# Patient Record
Sex: MALE | URBAN - METROPOLITAN AREA
[De-identification: names, ages, dates, MRNs, and addresses within clinical notes are randomized per-mention and may not be internally consistent; named-entity substitution may affect disease eponyms.]

---

## 2023-12-01 ENCOUNTER — OFFICE (OUTPATIENT)
Dept: URBAN - METROPOLITAN AREA CLINIC 11 | Facility: CLINIC | Age: 28
End: 2023-12-01

## 2023-12-01 VITALS
WEIGHT: 277 LBS | HEIGHT: 72 IN | OXYGEN SATURATION: 98 % | DIASTOLIC BLOOD PRESSURE: 129 MMHG | HEART RATE: 102 BPM | SYSTOLIC BLOOD PRESSURE: 156 MMHG

## 2023-12-01 DIAGNOSIS — K60.2 ANAL FISSURE, UNSPECIFIED: ICD-10-CM

## 2023-12-01 DIAGNOSIS — K62.89 OTHER SPECIFIED DISEASES OF ANUS AND RECTUM: ICD-10-CM

## 2023-12-01 DIAGNOSIS — Z80.0 FAMILY HISTORY OF MALIGNANT NEOPLASM OF DIGESTIVE ORGANS: ICD-10-CM

## 2023-12-01 PROCEDURE — 99204 OFFICE O/P NEW MOD 45 MIN: CPT | Performed by: STUDENT IN AN ORGANIZED HEALTH CARE EDUCATION/TRAINING PROGRAM

## 2023-12-01 RX ORDER — SODIUM PICOSULFATE, MAGNESIUM OXIDE, AND ANHYDROUS CITRIC ACID 10; 3.5; 12 MG/160ML; G/160ML; G/160ML
LIQUID ORAL
Qty: 350 | Refills: 0 | Status: CANCELLED
Start: 2023-12-01 | End: 2023-12-07

## 2023-12-01 NOTE — SERVICEHPINOTES
Mr. Rodrick Padron is a 28-year-old white male with no significant past medical history, who presents to [place] [id number] for severe anorectal pain.
br
br clinic visit 12/01/2023: 
br patient reports that he has had severe pain for the last 2 days.  He has only been able to have a small amount of bowel movement and is having a lot of straining.  He is not able to sit for very long and has the sensation of hemorrhoids on the outside of his bottom.  He reports he has not been able to sleep the last 2 nights because of how severe the pain is.  He has never had an EGD or colonoscopy.  He is not passing any blood in the stool or black tarry stool.  His family history significant for his father who was diagnosed with colon cancer at age 29 and his grandfather who also had colon cancer.  The patient reports he also has some uncles with colon cancer.  He does not smoke or use recreational drugs but does drink a 6 pack of beer every 3 days.  No previous abdominal surgeries.  He saw in urgent care yesterday who prescribed for him some type of cream as well as MiraLax.  He has only taken 2 doses of the MiraLax so far and he has not had any significant bowel movements.  no abdominal pain, nausea or vomiting.  His appetite has not been very good over the last 2 days but he thinks that is because of the amount of pain he has been in.

## 2023-12-01 NOTE — SERVICENOTES
patient has a posterior anal fissure.  Will treat him with nitroglycerin topical cream for 8 weeks and then perform colonoscopy for his  family history of colon cancer.  I told him to adhere to a high-fiber diet, take MiraLax once or twice a day to help soften his stools.  Take Tylenol as needed for pain but no more than the recommended daily dose on the bottle.  Discussed with the patient the risks and benefits of the procedure including bleeding, infection, anesthesia related complications.  Patient agrees proceed with the planned procedure.  All questions addressed.

## 2024-04-26 ENCOUNTER — AMBULATORY SURGICAL CENTER (OUTPATIENT)
Dept: URBAN - METROPOLITAN AREA SURGERY 3 | Facility: SURGERY | Age: 29
End: 2024-04-26
Payer: COMMERCIAL

## 2024-04-26 ENCOUNTER — OFFICE (OUTPATIENT)
Dept: URBAN - METROPOLITAN AREA PATHOLOGY 12 | Facility: PATHOLOGY | Age: 29
End: 2024-04-26
Payer: COMMERCIAL

## 2024-04-26 VITALS
HEART RATE: 78 BPM | HEART RATE: 78 BPM | RESPIRATION RATE: 22 BRPM | DIASTOLIC BLOOD PRESSURE: 86 MMHG | HEART RATE: 75 BPM | OXYGEN SATURATION: 100 % | HEART RATE: 79 BPM | DIASTOLIC BLOOD PRESSURE: 62 MMHG | RESPIRATION RATE: 16 BRPM | SYSTOLIC BLOOD PRESSURE: 149 MMHG | SYSTOLIC BLOOD PRESSURE: 121 MMHG | HEART RATE: 83 BPM | TEMPERATURE: 97.7 F | HEIGHT: 72 IN | HEART RATE: 75 BPM | SYSTOLIC BLOOD PRESSURE: 128 MMHG | RESPIRATION RATE: 16 BRPM | RESPIRATION RATE: 14 BRPM | HEART RATE: 82 BPM | SYSTOLIC BLOOD PRESSURE: 124 MMHG | RESPIRATION RATE: 22 BRPM | HEART RATE: 83 BPM | DIASTOLIC BLOOD PRESSURE: 86 MMHG | DIASTOLIC BLOOD PRESSURE: 72 MMHG | WEIGHT: 275 LBS | RESPIRATION RATE: 14 BRPM | DIASTOLIC BLOOD PRESSURE: 72 MMHG | TEMPERATURE: 98.8 F | WEIGHT: 275 LBS | DIASTOLIC BLOOD PRESSURE: 75 MMHG | SYSTOLIC BLOOD PRESSURE: 149 MMHG | TEMPERATURE: 98.8 F | HEART RATE: 82 BPM | RESPIRATION RATE: 18 BRPM | DIASTOLIC BLOOD PRESSURE: 62 MMHG | SYSTOLIC BLOOD PRESSURE: 121 MMHG | TEMPERATURE: 98.8 F | HEART RATE: 83 BPM | HEIGHT: 72 IN | SYSTOLIC BLOOD PRESSURE: 128 MMHG | DIASTOLIC BLOOD PRESSURE: 62 MMHG | HEART RATE: 75 BPM | RESPIRATION RATE: 16 BRPM | DIASTOLIC BLOOD PRESSURE: 72 MMHG | HEART RATE: 82 BPM | DIASTOLIC BLOOD PRESSURE: 75 MMHG | OXYGEN SATURATION: 100 % | RESPIRATION RATE: 18 BRPM | HEART RATE: 78 BPM | WEIGHT: 275 LBS | SYSTOLIC BLOOD PRESSURE: 149 MMHG | SYSTOLIC BLOOD PRESSURE: 124 MMHG | RESPIRATION RATE: 14 BRPM | SYSTOLIC BLOOD PRESSURE: 128 MMHG | TEMPERATURE: 97.7 F | RESPIRATION RATE: 18 BRPM | DIASTOLIC BLOOD PRESSURE: 86 MMHG | HEIGHT: 72 IN | TEMPERATURE: 97.7 F | HEART RATE: 79 BPM | SYSTOLIC BLOOD PRESSURE: 124 MMHG | RESPIRATION RATE: 22 BRPM | DIASTOLIC BLOOD PRESSURE: 75 MMHG | OXYGEN SATURATION: 100 % | SYSTOLIC BLOOD PRESSURE: 121 MMHG | HEART RATE: 79 BPM

## 2024-04-26 DIAGNOSIS — Z80.0 FAMILY HISTORY OF MALIGNANT NEOPLASM OF DIGESTIVE ORGANS: ICD-10-CM

## 2024-04-26 DIAGNOSIS — K63.5 POLYP OF COLON: ICD-10-CM

## 2024-04-26 DIAGNOSIS — K60.1 CHRONIC ANAL FISSURE: ICD-10-CM

## 2024-04-26 DIAGNOSIS — K62.89 OTHER SPECIFIED DISEASES OF ANUS AND RECTUM: ICD-10-CM

## 2024-04-26 DIAGNOSIS — K57.30 DIVERTICULOSIS OF LARGE INTESTINE WITHOUT PERFORATION OR ABS: ICD-10-CM

## 2024-04-26 PROCEDURE — 45380 COLONOSCOPY AND BIOPSY: CPT | Performed by: STUDENT IN AN ORGANIZED HEALTH CARE EDUCATION/TRAINING PROGRAM

## 2024-04-26 PROCEDURE — 88305 TISSUE EXAM BY PATHOLOGIST: CPT | Performed by: PATHOLOGY

## 2024-04-26 NOTE — SERVICEHPINOTES
Mr. Rodrick Padron is a 28-year-old white male with no significant past medical history, who presents to [place] [address] for severe anorectal pain.clinic visit 12/01/2023:brpatient reports that he has had severe pain for the last 2 days.  He has only been able to have a small amount of bowel movement and is having a lot of straining.  He is not able to sit for very long and has the sensation of hemorrhoids on the outside of his bottom.  He reports he has not been able to sleep the last 2 nights because of how severe the pain is.  He has never had an EGD or colonoscopy.  He is not passing any blood in the stool or black tarry stool.  His family history significant for his father who was diagnosed with colon cancer at age 29 and his grandfather who also had colon cancer.  The patient reports he also has some uncles with colon cancer.  He does not smoke or use recreational drugs but does drink a 6 pack of beer every 3 days.  No previous abdominal surgeries.  He saw in urgent care yesterday who prescribed for him some type of cream as well as MiraLax.  He has only taken 2 doses of the MiraLax so far and he has not had any significant bowel movements.  no abdominal pain, nausea or vomiting.  His appetite has not been very good over the last 2 days but he thinks that is because of the amount of pain he has been in.
estrellita
br   Colonoscopy 4/26/24:  
br
Not on blood thinners, only has used his nitro cream for 2 weeks then only uses it as needed, no PSH, last BM was clear.

## 2024-04-26 NOTE — SERVICEHPINOTES
Mr. Rodrick Padron is a 28-year-old white male with no significant past medical history, who presents to [place] [address] for severe anorectal pain.clinic visit 12/01/2023:brpatient reports that he has had severe pain for the last 2 days.  He has only been able to have a small amount of bowel movement and is having a lot of straining.  He is not able to sit for very long and has the sensation of hemorrhoids on the outside of his bottom.  He reports he has not been able to sleep the last 2 nights because of how severe the pain is.  He has never had an EGD or colonoscopy.  He is not passing any blood in the stool or black tarry stool.  His family history significant for his father who was diagnosed with colon cancer at age 29 and his grandfather who also had colon cancer.  The patient reports he also has some uncles with colon cancer.  He does not smoke or use recreational drugs but does drink a 6 pack of beer every 3 days.  No previous abdominal surgeries.  He saw in urgent care yesterday who prescribed for him some type of cream as well as MiraLax.  He has only taken 2 doses of the MiraLax so far and he has not had any significant bowel movements.  no abdominal pain, nausea or vomiting.  His appetite has not been very good over the last 2 days but he thinks that is because of the amount of pain he has been in.
estrellita
br   Colonoscopy 4/26/24:  
br
Not on blood thinners, only has used his nitro cream for 2 weeks then only uses it as needed, no PSH, last BM was clear.

## 2024-04-26 NOTE — SERVICEHPINOTES
Mr. Rodrick Padron is a 28-year-old white male with no significant past medical history, who presents to [place] [address] for severe anorectal pain.clinic visit 12/01/2023:brpatient reports that he has had severe pain for the last 2 days.  He has only been able to have a small amount of bowel movement and is having a lot of straining.  He is not able to sit for very long and has the sensation of hemorrhoids on the outside of his bottom.  He reports he has not been able to sleep the last 2 nights because of how severe the pain is.  He has never had an EGD or colonoscopy.  He is not passing any blood in the stool or black tarry stool.  His family history significant for his father who was diagnosed with colon cancer at age 29 and his grandfather who also had colon cancer.  The patient reports he also has some uncles with colon cancer.  He does not smoke or use recreational drugs but does drink a 6 pack of beer every 3 days.  No previous abdominal surgeries.  He saw in urgent care yesterday who prescribed for him some type of cream as well as MiraLax.  He has only taken 2 doses of the MiraLax so far and he has not had any significant bowel movements.  no abdominal pain, nausea or vomiting.  His appetite has not been very good over the last 2 days but he thinks that is because of the amount of pain he has been in.
estrellita
br   Colonoscopy 4/26/24:  
br
Not on blood thinners, only has used his nitro cream for 2 weeks then only uses it as needed, no PSH, last BM was clear.

## 2024-04-26 NOTE — SERVICENOTES
scope in 3:45
cecum 3:48
scope out 3:58
needs to continue topical nitro for 8 weeks and return to clinic
repeat colon likely in 5 years due to FHx+++
May need surgery referral if not responding to topical therapy.

## 2024-04-30 LAB
GASTRO ONE PATHOLOGY: PDF REPORT: (no result)